# Patient Record
(demographics unavailable — no encounter records)

---

## 2024-12-09 NOTE — PAST MEDICAL HISTORY
[Menstruating] : The patient is menstruating [Menarche Age ____] : age at menarche was [unfilled] [Definite ___ (Date)] : the last menstrual period was [unfilled] [Regular Cycle Intervals] : have been regular [Total Preg ___] : G[unfilled] [Live Births ___] : P[unfilled]  [Abortions ___] : Abortions:[unfilled] [Age At Live Birth ___] : Age at live birth: [unfilled] [History of Hormone Replacement Treatment] : has no history of hormone replacement treatment [FreeTextEntry6] : NO [FreeTextEntry7] : NO

## 2024-12-09 NOTE — ASSESSMENT
[FreeTextEntry1] : 49-year-old female status post right breast lumpectomy x 2 on 2/28/2024 for right breast radial scars x 2 as well as LCIS

## 2024-12-09 NOTE — PHYSICAL EXAM
[Normocephalic] : normocephalic [EOMI] : extra ocular movement intact [Supple] : supple [No Supraclavicular Adenopathy] : no supraclavicular adenopathy [Examined in the supine and seated position] : examined in the supine and seated position [No dominant masses] : no dominant masses in right breast  [No dominant masses] : no dominant masses left breast [No Nipple Retraction] : no left nipple retraction [No Nipple Discharge] : no left nipple discharge [No Axillary Lymphadenopathy] : no left axillary lymphadenopathy [No Rashes] : no rashes [de-identified] : Well-healed lumpectomy incision

## 2024-12-09 NOTE — PLAN
[TextEntry] : Bilateral screening mammogram and sonogram due in June 2025 Patient to perform self-breast exams as instructed in the office. Patient to follow-up in June 2025 after imaging completed.

## 2024-12-09 NOTE — DATA REVIEWED
[FreeTextEntry1] : 4/11/2023 ([facility]) bilateral screening mammogram: Heterogeneously dense, In the low 12 o'clock position of the right breast, there is a focal asymmetry which lies in the tissue within 3 cm superior to the nipple in the MLO projection. Additional mammographic and targeted sonographic evaluation this region is advised. In close proximity to the inferomedial edge of the focal asymmetry, there are grouped calcifications in the 1-2 o'clock region of the anterior third of the right breast. Additional mammographic imaging of this region with magnification is advised.In the left breast, no suspicious masses, architectural distortion, or significant calcifications are detected. IMPRESSION: Focal asymmetry and calcifications of the right breast warrant additional mammographic and targeted sonographic evaluation and separate magnification imaging (for the calcifications). No mammographic signs of malignancy left breast.FOLLOW-UP: Additional imaging. ASSESSMENT: BI-RADS Category 0  7/28/2023 ([facility]) right diagnostic mammogram: 1. Suspicious architectural distortion in the upper central right breast at anterior to middle depth, as well as an additional smaller area of architectural distortion in the upper central right breast at far posterior depth. Please note that the patient left the department before an additional spot compression view was able to be performed of the distortion at posterior depth on the cc view, as well as ultrasound was unable to be performed due to the patient leaving before the exam was completed. 2. Benign right breast calcifications, consistent with fibrocystic changes. FOLLOW-UP: Additional imaging. Additional spot compression view of the right breast as well as right breast ultrasound for the areas of architectural distortion seen in the right breast. ASSESSMENT: BI-RADS Category 0  10/10/2023 ([facility]) right diagnostic mammogram/US: 1. Suspicious architectural distortion in the upper central right breast at anterior to middle depth (please see diagnostic report/imaging from 7/28/2023 for more complete evaluation). Stereotactic biopsy is recommended. 2. Subtle architectural distortion in the upper central right breast at far posterior depth (please also see diagnostic report/imaging from 7/28/2023 for complete evaluation). Stereotactic biopsy is recommended. FOLLOW-UP: Stereotactic biopsy. Stereotactic right breast biopsy x2 ASSESSMENT: BI-RADS Category 4  11/6/2023 ([facility]): Right breast 12:00 posterior depth anterior to the pectoralis muscle (cork clip): Radial scar, fibrocystic changes associated with calcium oxalate Right breast periareolar, anterior depth approximately 1 cm FN (hourglass clip): Radial scar associated with calcifications, adjacent LCIS, fibroadenoma Pathology results indicate that both biopsy sites are high risk surgical consultation is advised  2/28/2024 ([facility]) right breast lumpectomy x 2: 1.  Right breast (12:00, anterior), lumpectomy:-   Benign breast tissue with multiple radial scars-   Fibroadenomata (2)-   Biopsy site changes-   Microscopic intraductal papilloma-   Foci of usual ductal hyperplasia, columnar cell change, fibrocystic change with apocrine metaplasia and sclerosing adenosis 2.  Right breast (12:00, posterior), lumpectomy:-   Benign breast tissue with radial scars (2)-   Biopsy site changes-   Foci of usual ductal hyperplasia, columnar cell change and fibrocystic change  6/27/2024 ([facility]) b/l diag mammo and US: heterogeneously dense. 1. No mammographic or ultrasound evidence of malignancy. 2. Probably benign nodules in the 9:00 position of the right breast, 7 cm from the nipple and 3:00 position of the left breast, 5 cm from the nipple six-month follow-up targeted bilateral ultrasound is recommended. BI-RADS 3.  12/9/24 ([facility]) b/l US: stable probably benign findings bilaterally, recommend additional short term follow-up at time of annual mammo June 2025. BI-RADS 3.